# Patient Record
Sex: MALE | Race: WHITE
[De-identification: names, ages, dates, MRNs, and addresses within clinical notes are randomized per-mention and may not be internally consistent; named-entity substitution may affect disease eponyms.]

---

## 2019-07-02 ENCOUNTER — HOSPITAL ENCOUNTER (EMERGENCY)
Dept: HOSPITAL 11 - JP.ED | Age: 49
Discharge: HOME | End: 2019-07-02
Payer: COMMERCIAL

## 2019-07-02 DIAGNOSIS — Z86.79: ICD-10-CM

## 2019-07-02 DIAGNOSIS — R07.89: Primary | ICD-10-CM

## 2019-07-02 PROCEDURE — 36415 COLL VENOUS BLD VENIPUNCTURE: CPT

## 2019-07-02 PROCEDURE — 81001 URINALYSIS AUTO W/SCOPE: CPT

## 2019-07-02 PROCEDURE — 85025 COMPLETE CBC W/AUTO DIFF WBC: CPT

## 2019-07-02 PROCEDURE — 84484 ASSAY OF TROPONIN QUANT: CPT

## 2019-07-02 PROCEDURE — 99284 EMERGENCY DEPT VISIT MOD MDM: CPT

## 2019-07-02 PROCEDURE — 80053 COMPREHEN METABOLIC PANEL: CPT

## 2019-07-02 PROCEDURE — 71045 X-RAY EXAM CHEST 1 VIEW: CPT

## 2019-07-02 PROCEDURE — 96374 THER/PROPH/DIAG INJ IV PUSH: CPT

## 2019-07-02 NOTE — CRLCR
Indication:



Chest wall pain



Technique:



Chest 1 view



Comparison:



None



Findings/Impression:



Cardiovascular and mediastinum: Heart size and vasculature are normal in 

caliber and appearance.  Mediastinum is within normal limits.  



Lungs and pleural space: Lungs are clear.  No sign of infiltrate or mass.  

No sign of pleural effusion.  No pneumothorax.  



Bones and soft tissues: No significant findings.



Dictated by Shawanda Thacker MD @ Jul 2 2019  5:53AM



Signed by Dr. Shawanda Thacker @ Jul 2 2019  5:54AM

## 2019-07-02 NOTE — EDM.PDOC
ED HPI GENERAL MEDICAL PROBLEM





- General


Chief Complaint: Chest Pain


Stated Complaint: CHEST PAINS


Time Seen by Provider: 07/02/19 05:27


Source of Information: Reports: Patient


History Limitations: Reports: No Limitations





- History of Present Illness


INITIAL COMMENTS - FREE TEXT/NARRATIVE: 





pt has a history of atrial fib and now he has chest pain after doing alot of 

swimming. 


Onset: Today, Other ( started in the middle of the nite. )


Duration: Hour(s):


Location: Reports: Chest


Associated Symptoms: Reports: Chest Pain, Other ( mild sob. He is just getting 

over a cold. )


  ** Chest


Pain Score (Numeric/FACES): 7





- Related Data


 Allergies











Allergy/AdvReac Type Severity Reaction Status Date / Time


 


No Known Allergies Allergy   Verified 07/02/19 05:38











Home Meds: 


 Home Meds





NK [No Known Home Meds]  07/02/19 [History]











ED ROS GENERAL





- Review of Systems


Review Of Systems: See Below


Constitutional: Reports: No Symptoms


HEENT: Reports: No Symptoms


Respiratory: Reports: Shortness of Breath


Cardiovascular: Reports: Chest Pain, Other (hurtsmore if he turns in a certain 

direction. )


Endocrine: Reports: No Symptoms


GI/Abdominal: Reports: No Symptoms


: Reports: No Symptoms


Musculoskeletal: Reports: Other (pt was doing alot of swimming and he is 

wondering if this is related to that activity. )


Skin: Reports: No Symptoms


Neurological: Reports: No Symptoms


Psychiatric: Reports: No Symptoms





ED EXAM, GENERAL





- Physical Exam


Exam: See Below


Free Text/Narrative:: 





pt arrived with pain the middle of his chest more on the left. He had been 

[place]. He  woke up with pain in the 

chest. It did hurt more when he moved. 


Exam Limited By: No Limitations


General Appearance: Alert, Anxious, Moderate Distress


Ears: Normal TMs


Nose: Normal Inspection


Throat/Mouth: Normal Inspection


Head: Atraumatic


Neck: Normal Inspection


Respiratory/Chest: No Respiratory Distress, Other (pt is very tender in the 

left costosternal joints. )


Cardiovascular: Regular Rate, Rhythm, Other (pt does have a history of atrial 

fib. )


GI/Abdominal: Soft, Non-Tender


 (Male) Exam: Deferred


Rectal (Males) Exam: Deferred


Back Exam: Normal Inspection


Extremities: Normal Inspection


Neurological: Alert, Oriented, Normal Cognition


Psychiatric: Anxious





Course





- Vital Signs


Last Recorded V/S: 


 Last Vital Signs











Temp  35.7 C   07/02/19 05:31


 


Pulse  73   07/02/19 06:09


 


Resp  18   07/02/19 06:09


 


BP  141/97 H  07/02/19 06:09


 


Pulse Ox  97   07/02/19 06:09














- Orders/Labs/Meds


Orders: 


 Active Orders 24 hr











 Category Date Time Status


 


 UA W/MICROSCOPIC [URIN] Urgent Lab  07/02/19 06:25 Ordered











Labs: 


 Laboratory Tests











  07/02/19 07/02/19 Range/Units





  05:34 05:34 


 


WBC  7.1   (4.5-11.0)  K/uL


 


RBC  4.39   (4.30-5.90)  M/uL


 


Hgb  13.7   (12.0-15.0)  g/dL


 


Hct  39.9 L   (40.0-54.0)  %


 


MCV  91   (80-98)  fL


 


MCH  31   (27-31)  pg


 


MCHC  34   (32-36)  %


 


Plt Count  229   (150-400)  K/uL


 


Neut % (Auto)  52   (36-66)  %


 


Lymph % (Auto)  35   (24-44)  %


 


Mono % (Auto)  10 H   (2-6)  %


 


Eos % (Auto)  4   (2-4)  %


 


Baso % (Auto)  1   (0-1)  %


 


Sodium   141  (140-148)  mmol/L


 


Potassium   4.0  (3.6-5.2)  mmol/L


 


Chloride   104  (100-108)  mmol/L


 


Carbon Dioxide   27  (21-32)  mmol/L


 


Anion Gap   10.2  (5.0-14.0)  mmol/L


 


BUN   18  (7-18)  mg/dL


 


Creatinine   1.1  (0.8-1.3)  mg/dL


 


Est Cr Clr Drug Dosing   92.81  mL/min


 


Estimated GFR (MDRD)   > 60  (>60)  


 


Glucose   114 H  ()  mg/dL


 


Calcium   8.8  (8.5-10.1)  mg/dL


 


Total Bilirubin   0.5  (0.2-1.0)  mg/dL


 


AST   24  (15-37)  U/L


 


ALT   44  (12-78)  U/L


 


Alkaline Phosphatase   85  ()  U/L


 


Troponin I   < 0.017  (0.000-0.056)  ng/mL


 


Total Protein   7.1  (6.4-8.2)  g/dL


 


Albumin   3.6  (3.4-5.0)  g/dL


 


Globulin   3.5  (2.3-3.5)  g/dL


 


Albumin/Globulin Ratio   1.0 L  (1.2-2.2)  











Meds: 


Medications














Discontinued Medications














Generic Name Dose Route Start Last Admin





  Trade Name Kalie  PRN Reason Stop Dose Admin


 


Aspirin  324 mg  07/02/19 05:27  07/02/19 05:52





  Aspirin  PO  07/02/19 05:28  324 mg





  ONETIME ONE   Administration





     





     





     





     


 


Ketorolac Tromethamine  60 mg  07/02/19 05:57  07/02/19 06:11





  Toradol  IM  07/02/19 05:58  Not Given





  ONETIME ONE   





     





     





     





     


 


Ketorolac Tromethamine  30 mg  07/02/19 06:01  07/02/19 06:05





  Toradol  IVPUSH  07/02/19 06:02  30 mg





  ONETIME ONE   Administration





     





     





     





     














- Re-Assessments/Exams


Free Text/Narrative Re-Assessment/Exam: 





07/02/19 06:07


pt had a normal ekg. His rhytm was sinus. He had a normal chest xray. He had a 

normal trop and his other labs looked normal.  He was given torodol 30mg iv to 

see if he got good relief. 


07/02/19 06:27


pt did get relief from the torodol. 





Departure





- Departure


Time of Disposition: 06:27


Disposition: Home, Self-Care 01


Condition: Fair


Clinical Impression: 


 Chest wall pain, History of atrial fibrillation





Referrals: 


PCP,None [Primary Care Provider] - 


Forms:  ED Department Discharge


Care Plan Goals: 


moist heat  to the anterior chest, torodol 10mg q6h with food for the next 3-4 

days. 





- My Orders


Last 24 Hours: 


My Active Orders





07/02/19 06:25


UA W/MICROSCOPIC [URIN] Urgent 














- Assessment/Plan


Last 24 Hours: 


My Active Orders





07/02/19 06:25


UA W/MICROSCOPIC [URIN] Urgent